# Patient Record
Sex: FEMALE | Race: BLACK OR AFRICAN AMERICAN
[De-identification: names, ages, dates, MRNs, and addresses within clinical notes are randomized per-mention and may not be internally consistent; named-entity substitution may affect disease eponyms.]

---

## 2018-04-27 ENCOUNTER — HOSPITAL ENCOUNTER (EMERGENCY)
Dept: HOSPITAL 26 - MED | Age: 28
LOS: 1 days | Discharge: LEFT BEFORE DISCHARGE | End: 2018-04-28
Payer: SELF-PAY

## 2018-04-27 VITALS — WEIGHT: 211.5 LBS | BODY MASS INDEX: 35.24 KG/M2 | HEIGHT: 65 IN

## 2018-04-27 VITALS — SYSTOLIC BLOOD PRESSURE: 139 MMHG | DIASTOLIC BLOOD PRESSURE: 86 MMHG

## 2018-04-27 DIAGNOSIS — Z53.21: Primary | ICD-10-CM

## 2019-04-03 ENCOUNTER — HOSPITAL ENCOUNTER (EMERGENCY)
Dept: HOSPITAL 72 - EMR | Age: 29
LOS: 1 days | Discharge: PSYCHIATRIC OR SUBSTANCE USE FACILITY | End: 2019-04-04
Payer: MEDICAID

## 2019-04-03 VITALS — SYSTOLIC BLOOD PRESSURE: 132 MMHG | DIASTOLIC BLOOD PRESSURE: 84 MMHG

## 2019-04-03 VITALS — DIASTOLIC BLOOD PRESSURE: 70 MMHG | SYSTOLIC BLOOD PRESSURE: 122 MMHG

## 2019-04-03 VITALS — HEIGHT: 66 IN | WEIGHT: 140 LBS | BODY MASS INDEX: 22.5 KG/M2

## 2019-04-03 VITALS — SYSTOLIC BLOOD PRESSURE: 121 MMHG | DIASTOLIC BLOOD PRESSURE: 75 MMHG

## 2019-04-03 DIAGNOSIS — F15.10: ICD-10-CM

## 2019-04-03 DIAGNOSIS — F29: ICD-10-CM

## 2019-04-03 DIAGNOSIS — F91.9: Primary | ICD-10-CM

## 2019-04-03 LAB
ADD MANUAL DIFF: NO
ALBUMIN SERPL-MCNC: 4 G/DL (ref 3.4–5)
ALBUMIN/GLOB SERPL: 1 {RATIO} (ref 1–2.7)
ALP SERPL-CCNC: 91 U/L (ref 46–116)
ALT SERPL-CCNC: 23 U/L (ref 12–78)
ANION GAP SERPL CALC-SCNC: 9 MMOL/L (ref 5–15)
AST SERPL-CCNC: 21 U/L (ref 15–37)
BASOPHILS NFR BLD AUTO: 1 % (ref 0–2)
BILIRUB SERPL-MCNC: 0.2 MG/DL (ref 0.2–1)
BUN SERPL-MCNC: 14 MG/DL (ref 7–18)
CALCIUM SERPL-MCNC: 8.9 MG/DL (ref 8.5–10.1)
CHLORIDE SERPL-SCNC: 104 MMOL/L (ref 98–107)
CO2 SERPL-SCNC: 28 MMOL/L (ref 21–32)
CREAT SERPL-MCNC: 0.7 MG/DL (ref 0.55–1.3)
EOSINOPHIL NFR BLD AUTO: 1.2 % (ref 0–3)
ERYTHROCYTE [DISTWIDTH] IN BLOOD BY AUTOMATED COUNT: 12 % (ref 11.6–14.8)
GLOBULIN SER-MCNC: 4 G/DL
HCT VFR BLD CALC: 39.8 % (ref 37–47)
HGB BLD-MCNC: 13.5 G/DL (ref 12–16)
LYMPHOCYTES NFR BLD AUTO: 22.1 % (ref 20–45)
MCV RBC AUTO: 90 FL (ref 80–99)
MONOCYTES NFR BLD AUTO: 5.3 % (ref 1–10)
NEUTROPHILS NFR BLD AUTO: 70.3 % (ref 45–75)
PLATELET # BLD: 300 K/UL (ref 150–450)
POTASSIUM SERPL-SCNC: 3.4 MMOL/L (ref 3.5–5.1)
RBC # BLD AUTO: 4.44 M/UL (ref 4.2–5.4)
SODIUM SERPL-SCNC: 141 MMOL/L (ref 136–145)
WBC # BLD AUTO: 10.3 K/UL (ref 4.8–10.8)

## 2019-04-03 PROCEDURE — 80329 ANALGESICS NON-OPIOID 1 OR 2: CPT

## 2019-04-03 PROCEDURE — 81025 URINE PREGNANCY TEST: CPT

## 2019-04-03 PROCEDURE — 36415 COLL VENOUS BLD VENIPUNCTURE: CPT

## 2019-04-03 PROCEDURE — 80053 COMPREHEN METABOLIC PANEL: CPT

## 2019-04-03 PROCEDURE — 80307 DRUG TEST PRSMV CHEM ANLYZR: CPT

## 2019-04-03 PROCEDURE — 85025 COMPLETE CBC W/AUTO DIFF WBC: CPT

## 2019-04-03 PROCEDURE — 96372 THER/PROPH/DIAG INJ SC/IM: CPT

## 2019-04-03 PROCEDURE — 99284 EMERGENCY DEPT VISIT MOD MDM: CPT

## 2019-04-03 NOTE — NUR
ER Nurse Note:



Pt asleep but occationaly wakes up and tries to get out of bed. Pt is 
reorientated each time and goes back to sleep. Pt is not ready for be taken off 
restraints. All safety measures met; will conitnue to gavin.

## 2019-04-03 NOTE — NUR
ER Nurse Note:



Pt became uncooperative after providing blood and urine. Pt was refusing all 
treatment and yelling she needed to leave. Per Liliane ER PA and Dr. Wilcox, ER MD decision, pt is unsafe to be discharged from hospital. 
Security called; multiple staff members talked and tried to calm down pt. Food, 
drinks given; pt slightly less agitated but continues to be danger to self and 
others. ERPA and ERMD aware; restraints initiated and IM medication given.

## 2019-04-03 NOTE — NUR
ED Nurse Note:



Patient briana RA 68 from home c/o behavioral complaint, patient's family was the 
one who called 911 and wanted her to be evaluated, denies any pain

## 2019-04-03 NOTE — EMERGENCY ROOM REPORT
History of Present Illness


General


Chief Complaint:  Behavioral Complaint


Source:  Patient


 (Liliane Funez)





Present Illness


HPI


29 YO female presents to the ED by RA after Familly called 911 and reports pt. 

was missing for 5-7. hx of bipolar. reports bizarre behavior. Denies drug use. 


pt reports previously taking Lexapro, last dose unknown.  HPI and ROS limited 

due to poor pt. cooperation.  Denies Si/HI, denies pain, denies having any 

medical complaints at this time. 


 (Liliane Funez)


Allergies:  


Coded Allergies:  


     No Known Allergies (Unverified , 4/3/19)





Patient History


Past Medical History:  see triage record


Past Surgical History:  unable to obtain


Pertinent Family History:  unable to obtain


Last Menstrual Period:  3/15/19


Pregnant Now:  No


Reviewed Nursing Documentation:  PMH: Agreed; PSxH: Agreed (Liliane Funez)





Nursing Documentation-PMH


Past Medical History:  No Stated History


 (Liliane Funez)





Review of Systems


All Other Systems:  limited


 (Liliane Funez)





Physical Exam





Vital Signs








  Date Time  Temp Pulse Resp B/P (MAP) Pulse Ox O2 Delivery O2 Flow Rate FiO2


 


4/3/19 19:15 99.0 95 18 121/75 100 Room Air  








Sp02 EP Interpretation:  reviewed, normal


General Appearance:  no apparent distress, alert, GCS 15, non-toxic


Head:  normocephalic, atraumatic


Eyes:  bilateral eye normal inspection, bilateral eye PERRL


ENT:  hearing grossly normal, normal voice


Neck:  full range of motion, no bony tend


Respiratory:  chest non-tender, lungs clear, normal breath sounds, speaking 

full sentences


Cardiovascular #1:  regular rate, rhythm


Gastrointestinal:  non tender, soft


Musculoskeletal:  back normal, gait/station normal, normal range of motion, non-

tender


Neurologic:  alert, oriented x3, responsive, motor strength/tone normal, 

sensory intact, normal gait, speech normal, grossly normal


Psychiatric:  other - Pt has flat affect. non-aggressive, Does not provide much 

detail/ not talkative.  Depressed/withdrawn mood.


Skin:  normal color, no rash, warm/dry, well hydrated


Lymphatic:  no adenopathy


 (Liliane Funez)





Medical Decision Making


PA Attestation


Dr. lopez is my supervising Physician whom patient management has been 

discussed with.


Medical:  Substance Abuse, Other - unknown psych and possible drug use.


Behavioral:  Depression


Reaction to Intervention:  Improved


Restraint Reassesment


ILiliane PA, have personally evaluated this patient. Laboratory tests 

have been reviewed and addressed accordingly.  The patient is deemed to present 

a danger to themselves and/or others.  This is based on the exam, history (

provided by patient, EMS/[facility] and/or family) and observed or reported behavior.

  


Attempts for non-invasive measures have been considered and/or attempted, 

however, have been futile. It is in the best interest of the nursing staff, the 

patient, and others involved in this patient's care that behavioral restraints 

be applied. 





Patient evaluation reveals the following:


 (Liliane Funez)


Diagnostic Impression:  


 Primary Impression:  


 Behavioral change


 Additional Impressions:  


 Psychosis


 Qualified Codes:  F29 - Unspecified psychosis not due to a substance or known 

physiological condition


 Gravely disabled


ER Course


29 YO female presents to the ED by RA after Familly called 911 and reports pt. 

was missing for 5-7. hx of bipolar. reports bizarre behavior. Denies drug use. 


pt reports previously taking Lexapro, last dose unknown.  HPI and ROS limited 

due to poor pt. cooperation.  Denies Si/HI, denies pain, denies having any 

medical complaints at this time. 





Pt has flat affect. non-aggressive, Does not provide much detail/ not 

talkative.  





Ddx considered but are not limited to OD, SI/HI, psychosis, UTI, intoxication





Vital signs: are WNL, pt. is afebrile





H&PE are most consistent with behavioral/mental health issue





ORDERS:





-CBC, CMP:   Unremarkable


-UA:    negative for infection/ unremarkable see results attached. 


-UDS:  POSITIVE for AMPHETAMINES 


-Salicylates and Acetaminophen   -WNL


-Serum ETOH  - No evidence of acute intoxication  








ED INTERVENTIONS: 


- Haldol 10mg IM


-Ativan 1mg IM


-Benadryl 50mg IM





---Leather restraints were required until pt. was calm 


 





DISPOSITION: patient is medically cleared and will be under ED observation 

awaiting psychiatric evaluation for final disposition and transfer to 

appropriate facility.





Labs








Test


  4/3/19


20:30 4/3/19


20:50


 


White Blood Count


  10.3 K/UL


(4.8-10.8) 


 


 


Red Blood Count


  4.44 M/UL


(4.20-5.40) 


 


 


Hemoglobin


  13.5 G/DL


(12.0-16.0) 


 


 


Hematocrit


  39.8 %


(37.0-47.0) 


 


 


Mean Corpuscular Volume 90 FL (80-99)  


 


Mean Corpuscular Hemoglobin


  30.5 PG


(27.0-31.0) 


 


 


Mean Corpuscular Hemoglobin


Concent 34.0 G/DL


(32.0-36.0) 


 


 


Red Cell Distribution Width


  12.0 %


(11.6-14.8) 


 


 


Platelet Count


  300 K/UL


(150-450) 


 


 


Mean Platelet Volume


  6.4 FL


(6.5-10.1) 


 


 


Neutrophils (%) (Auto)


  70.3 %


(45.0-75.0) 


 


 


Lymphocytes (%) (Auto)


  22.1 %


(20.0-45.0) 


 


 


Monocytes (%) (Auto)


  5.3 %


(1.0-10.0) 


 


 


Eosinophils (%) (Auto)


  1.2 %


(0.0-3.0) 


 


 


Basophils (%) (Auto)


  1.0 %


(0.0-2.0) 


 


 


Sodium Level


  141 MMOL/L


(136-145) 


 


 


Potassium Level


  3.4 MMOL/L


(3.5-5.1) 


 


 


Chloride Level


  104 MMOL/L


() 


 


 


Carbon Dioxide Level


  28 MMOL/L


(21-32) 


 


 


Anion Gap


  9 mmol/L


(5-15) 


 


 


Blood Urea Nitrogen


  14 mg/dL


(7-18) 


 


 


Creatinine


  0.7 MG/DL


(0.55-1.30) 


 


 


Estimat Glomerular Filtration


Rate > 60 mL/min


(>60) 


 


 


Glucose Level


  77 MG/DL


() 


 


 


Calcium Level


  8.9 MG/DL


(8.5-10.1) 


 


 


Total Bilirubin


  0.2 MG/DL


(0.2-1.0) 


 


 


Aspartate Amino Transf


(AST/SGOT) 21 U/L (15-37) 


  


 


 


Alanine Aminotransferase


(ALT/SGPT) 23 U/L (12-78) 


  


 


 


Alkaline Phosphatase


  91 U/L


() 


 


 


Total Protein


  8.0 G/DL


(6.4-8.2) 


 


 


Albumin


  4.0 G/DL


(3.4-5.0) 


 


 


Globulin 4.0 g/dL  


 


Albumin/Globulin Ratio 1.0 (1.0-2.7)  


 


Salicylates Level


  1.8 ug/mL


(2.8-20) 


 


 


Acetaminophen Level


  < 2 MCG/ML


(10-30) 


 


 


Serum Alcohol < 3 mg/dL  


 


Urine Opiates Screen


  


  Negative


(NEGATIVE)


 


Urine Barbiturates Screen


  


  Negative


(NEGATIVE)


 


Phencyclidine (PCP) Screen


  


  Negative


(NEGATIVE)


 


Urine Amphetamines Screen


  


  Positive


(NEGATIVE)


 


Urine Benzodiazepines Screen


  


  Negative


(NEGATIVE)


 


Urine Cocaine Screen


  


  Negative


(NEGATIVE)


 


Urine Marijuana (THC) Screen


  


  Negative


(NEGATIVE)








 (Liliane Funez)


ER Course


Patient brought in for bizarre behavior.  She appeared to have acute psychosis.

  Also with positive for amphetamine.  She is gravely disabled.  Does of that, 

I placed her on a 5150 hold.


 (Cristofer Lester MD)





Last Vital Signs








  Date Time  Temp Pulse Resp B/P (MAP) Pulse Ox O2 Delivery O2 Flow Rate FiO2


 


4/3/19 19:56 99.0 95 18 121/75 100 Room Air  








 (Liliane Funez)


Status:  improved


 (Cristofer Lester MD)


Disposition:  XFER TO PSYCH HOSP/UNIT


Condition:  Stable


Signed Out To:  Dr. Lester


 (Liliane Funez)











Liliane Funez Apr 3, 2019 20:49


Cristofer Lester MD Apr 4, 2019 01:48

## 2019-04-04 VITALS — SYSTOLIC BLOOD PRESSURE: 124 MMHG | DIASTOLIC BLOOD PRESSURE: 74 MMHG

## 2019-04-04 VITALS — SYSTOLIC BLOOD PRESSURE: 128 MMHG | DIASTOLIC BLOOD PRESSURE: 80 MMHG

## 2019-04-04 NOTE — NUR
ER Nurse Note:



Report given to DERIK Arambula for continuity of care. Pt asleep and calm, VSS, no 
signs of distress. All belongings taken and given to EMS.

## 2019-04-04 NOTE — NUR
ER Nurse Note:



Pt calm and asleep, VSS, no signs of distress. Pt denies pain, no longer on 
restraints. Pt is being transfered for continuity of care; awaiting transport. 
All safety measures met; will continue to monitor.

## 2019-04-04 NOTE — NUR
ER Nurse Note:



Per Dr. Lester, pt was put on a 5150 hold. Belongings taken and put in locker 
3. Pt asleep, calm, VSS, no signs of distress. Per ERMD orders, restrants DC. 
Skin intact, cap refill less than 3 sec. All safety measures met; will continue 
to monitor.